# Patient Record
Sex: FEMALE | Race: WHITE | Employment: FULL TIME | URBAN - METROPOLITAN AREA
[De-identification: names, ages, dates, MRNs, and addresses within clinical notes are randomized per-mention and may not be internally consistent; named-entity substitution may affect disease eponyms.]

---

## 2020-07-06 ENCOUNTER — HOSPITAL ENCOUNTER (OUTPATIENT)
Dept: PHYSICAL THERAPY | Age: 25
Discharge: HOME | End: 2020-07-06
Payer: COMMERCIAL

## 2020-07-06 PROCEDURE — 97162 PT EVAL MOD COMPLEX 30 MIN: CPT

## 2020-07-06 PROCEDURE — 97535 SELF CARE MNGMENT TRAINING: CPT

## 2020-07-06 PROCEDURE — 97140 MANUAL THERAPY 1/> REGIONS: CPT

## 2020-07-06 NOTE — PROGRESS NOTES
[address] PHYSICAL THERAPY  [address] [place] [address] [address],Ariela Watts, [address] [facility] [address] - Phone: [phone]  Fax: [phone] / [phone] [facility]  Patient Name: Gurpreet Nicole : 1995   Medical   Diagnosis: Back pain [M54.9] Treatment Diagnosis: Back pain [M54.9]   Onset Date: Chronic     Referral Source: Angeles Norwood MD Start of Care [facility]): 2020   Prior Hospitalization: See medical history Provider #: [id number]   Prior Level of Function: Difficulty with prolonged sitting    Comorbidities: Anemia   Medications: Verified on Patient Summary List   The Plan of Care and following information is based on the information from the initial evaluation.   ===========================================================================================  Assessment / key information:  Gurpreet Nicole is a 26 y.o.  yo female with Dx of Back pain [M54.9]. She reports chronic Hx of scoliosis since middle school. She currently rates her pain as 8/10 at worst, 1/10 at best, primarily located at R upper back and L lower lumbar region. She complains of difficulty and increase pain with sitting without a back support. Objective Findings:  Manual Muscle Testing:   L hip ext is Reduced. Lumbar/SI Screening:  L iliosacral ant innominate noted. L5 L ERS noted. Palpation:  Significant increase in soft tissue tensio noted at L psoas. Limited mobility at T6-8 on R, limited rib mobility at R 4-6th rib noted.    Pt instructed in HEP and will f/u in clinic for PT.  ===========================================================================================  Eval Complexity: History MEDIUM  Complexity : 1-2 comorbidities / personal factors will impact the outcome/ POC ;  Examination  MEDIUM Complexity : 3 Standardized tests and measures addressing body structure, function, activity limitation and / or participation in recreation ; Presentation MEDIUM Complexity : Evolving with changing characteristics ; Decision Making MEDIUM Complexity : FOTO score of 26-74; Overall Complexity MEDIUM  Problem List: pain affecting function, decrease ROM, decrease strength, decrease ADL/ functional abilitiies, decrease activity tolerance and decrease flexibility/ joint mobility   Treatment Plan may include any combination of the following: Therapeutic exercise, Therapeutic activities, Neuromuscular re-education, Physical agent/modality, Manual therapy, Patient education, Self Care training and Functional mobility training  Patient / Family readiness to learn indicated by: asking questions  Persons(s) to be included in education: patient (P)  Barriers to Learning/Limitations: None  Measures taken, if barriers to learning:    Patient Goal (s): Decrease pain    Patient self reported health status: excellent  Rehabilitation Potential: good     Short Term Goals: To be accomplished in  1-2  weeks:  1. Independent with HEP. 2. Decrease max pain 25-50% to assist with ADLs   Long Term Goals: To be accomplished in  3-4  weeks:  1. Decrease max pain 50-75% to assist with ADLs  2. Increase FOTO score to 86% to show functional improvment. 3.  Will rate  >/= +5 on Global Rating of Change and be prepared to DC to HEP. Frequency / Duration:   Patient to be seen  2-3  times per week for 3-4  weeks:  Patient / Caregiver education and instruction: self care and exercises    Therapist Signature: Lars Cao, ERICA, OCS, SCS, CSCS Date: 7/0/7608   Certification Period: na Time: 12:16 PM   ===========================================================================================  I certify that the above Physical Therapy Services are being furnished while the patient is under my care. I agree with the treatment plan and certify that this therapy is necessary.     Physician Signature:        Date:       Time:     Please sign and return to [facility] [place] or you may fax the signed copy to [phone]. Thank you.

## 2020-07-06 NOTE — PROGRESS NOTES
PHYSICAL THERAPY - DAILY TREATMENT NOTE    Patient Name: Enriqueta Rojas        Date: 2020  : 1995   YES Patient  Verified  Visit #:     Insurance: Payor: Afua Gudino / Plan: Nathalia Morrison HMO / Product Type: HMO /      In time: 12:20 Out time: 1:00   Total Treatment Time: 40     Medicare Time Tracking (below)   Total Timed Codes (min):  na 1:1 Treatment Time:  na     TREATMENT AREA =  Back pain [M54.9]    SUBJECTIVE  Pain Level (on 0 to 10 scale):    Medication Changes/New allergies or changes in medical history, any new surgeries or procedures? NO    If yes, update Summary List   Subjective Functional Status/Changes:  []  No changes reported     SEE IE          OBJECTIVE    15 min Manual Therapy: DTM L psoas, Rib 4-6 mob on R, T/S mob, shot gun tech, L IS ant inn pierce, L5 L ERS pierce   Rationale:      decrease pain, increase ROM and increase tissue extensibility to improve patient's ability to perform ADLs    10 min Self Care: MARGARET L hip shift, t/s ROt   Rationale:    increase ROM, increase strength and improve coordination to improve the patients ability to perform ADLs      Billed With/As:   [] TE   [] TA   [] Neuro   [x] Self Care Patient Education: [x] Review HEP    [] Progressed/Changed HEP based on:   [] positioning   [] body mechanics   [] transfers   [] heat/ice application    [] other:       min Patient Education:  YES  Reviewed HEP   []  Progressed/Changed HEP based on:         Other Objective/Functional Measures:    SEE IE     Post Treatment Pain Level (on 0 to 10) scale:       ASSESSMENT  Assessment/Changes in Function:     SEE IE     []  See Progress Note/Recertification   Patient will continue to benefit from skilled PT services to modify and progress therapeutic interventions, address functional mobility deficits, address ROM deficits, address strength deficits, analyze and address soft tissue restrictions, analyze and cue movement patterns, analyze and modify body mechanics/ergonomics and assess and modify postural abnormalities to attain remaining goals.    Progress toward goals / Updated goals:         PLAN  []  Upgrade activities as tolerated YES Continue plan of care   []  Discharge due to :    []  Other:      Therapist: Madeline Martin, PT, OCS, SCS, CSCS    Date: 7/6/2020 Time: 12:17 PM       Future Appointments   Date Time Provider Mark Metcalf   7/6/2020 12:30 PM [id number]

## 2020-07-09 ENCOUNTER — HOSPITAL ENCOUNTER (OUTPATIENT)
Dept: PHYSICAL THERAPY | Age: 25
Discharge: HOME | End: 2020-07-09
Payer: COMMERCIAL

## 2020-07-09 PROCEDURE — 97110 THERAPEUTIC EXERCISES: CPT

## 2020-07-09 PROCEDURE — 97140 MANUAL THERAPY 1/> REGIONS: CPT

## 2020-07-09 NOTE — PROGRESS NOTES
PHYSICAL THERAPY - DAILY TREATMENT NOTE    Patient Name: Burnis Koyanagi        Date: 2020  : 1995   yes Patient  Verified  Visit #:      12  Insurance: Payor: Aneesh Mendiola / Plan: [place] HMO / Product Type: HMO /      In time: 3:10 Out time: 3:55   Total Treatment Time: 45     Medicare/BCBS Time Tracking (below)   Total Timed Codes (min):  na 1:1 Treatment Time:  na     TREATMENT AREA =  Back pain [M54.9]  SUBJECTIVE  Pain Level (on 0 to 10 scale):  3  / 10   Medication Changes/New allergies or changes in medical history, any new surgeries or procedures?    no  If yes, update Summary List   Subjective Functional Status/Changes:  []  No changes reported     I did the HEP, but not sure if I was doing it correctly           OBJECTIVE      30 min Therapeutic Exercise:  [x]  See flow sheet   Rationale:      increase ROM, increase strength and improve coordination to improve the patients ability to perform ADLs     15 min Manual Therapy: DTM L psoas, Rib 5-6 mob on R, T/S mob, shot gun tech, L IS ant inn pierce, L5 L ERS pierce   Rationale:      decrease pain, increase ROM and increase tissue extensibility to improve patient's ability to perform ADLs      Billed With/As:   [] TE   [] TA   [] Neuro   [] Self Care Patient Education: [x] Review HEP    [] Progressed/Changed HEP based on:   [] positioning   [] body mechanics   [] transfers   [] heat/ice application    [] other:      Other Objective/Functional Measures:    Cont to have sig increase in soft tissue tension noted on L      Post Treatment Pain Level (on 0 to 10) scale:   0  / 10     ASSESSMENT  Assessment/Changes in Function:     Difficulty with L 1/2 TGU     []  See Progress Note/Recertification   Patient will continue to benefit from skilled PT services to modify and progress therapeutic interventions, address functional mobility deficits and address ROM deficits to attain remaining goals. Progress toward goals / Updated goals:     Independent with HEP     PLAN  [x]  Upgrade activities as tolerated yes Continue plan of care   []  Discharge due to :    []  Other:      Therapist: Katie Altman PT    Date: 7/9/2020 Time: 7:51 AM     Future Appointments   Date Time Provider Mark Metcalf   7/9/2020  3:15 PM Federica Parra, PT [facility]   7/24/2020  8:30 AM Federica Parra, PT [facility]   7/30/2020  7:45 AM Federica Parra, PT [id number]   8/6/2020  8:30 AM Federica Parra, PT [id number]

## 2020-07-24 ENCOUNTER — HOSPITAL ENCOUNTER (OUTPATIENT)
Dept: PHYSICAL THERAPY | Age: 25
Discharge: HOME | End: 2020-07-24
Payer: COMMERCIAL

## 2020-07-24 PROCEDURE — 97110 THERAPEUTIC EXERCISES: CPT

## 2020-07-24 PROCEDURE — 97140 MANUAL THERAPY 1/> REGIONS: CPT

## 2020-07-30 ENCOUNTER — HOSPITAL ENCOUNTER (OUTPATIENT)
Dept: PHYSICAL THERAPY | Age: 25
Discharge: HOME | End: 2020-07-30
Payer: COMMERCIAL

## 2020-07-30 PROCEDURE — 97140 MANUAL THERAPY 1/> REGIONS: CPT

## 2020-07-30 PROCEDURE — 97110 THERAPEUTIC EXERCISES: CPT

## 2020-07-30 NOTE — PROGRESS NOTES
PHYSICAL THERAPY - DAILY TREATMENT NOTE    Patient Name: Pete Bansal        Date: 2020  : 1995   yes Patient  Verified  Visit #:      12  Insurance: Payor: Harsh Zaragoza / Plan: [address] [place] HMO / Product Type: HMO /      In time: 7:45 Out time: 8:30   Total Treatment Time: 45     Medicare/BCBS Time Tracking (below)   Total Timed Codes (min):  na 1:1 Treatment Time:  na     TREATMENT AREA =  Back pain [M54.9]  SUBJECTIVE  Pain Level (on 0 to 10 scale):  0  / 10   Medication Changes/New allergies or changes in medical history, any new surgeries or procedures?    no  If yes, update Summary List   Subjective Functional Status/Changes:  []  No changes reported     Its been pretty good. OBJECTIVE  30 min Therapeutic Exercise:  [x]? ?  See flow sheet   Rationale:      increase ROM, increase strength and improve coordination to improve the patients ability to perform ADLs      15 min Manual Therapy: DTM L psoas, T/S mob,  L3 L FRS pierce   Rationale:      decrease pain, increase ROM and increase tissue extensibility to improve patient's ability to perform ADLs         Billed With/As:   [] TE   [] TA   [] Neuro   [] Self Care Patient Education: [x] Review HEP    [] Progressed/Changed HEP based on:   [] positioning   [] body mechanics   [] transfers   [] heat/ice application    [] other:      Other Objective/Functional Measures:    Min decreased mobility noted at t/s today     Post Treatment Pain Level (on 0 to 10) scale:   0  / 10     ASSESSMENT  Assessment/Changes in Function:     josé miguel all progression of ex well without increase in pain      []  See Progress Note/Recertification   Patient will continue to benefit from skilled PT services to modify and progress therapeutic interventions, address functional mobility deficits and address ROM deficits to attain remaining goals.    Progress toward goals / Updated goals:    Progressing well with function      PLAN  [x]  Upgrade activities as tolerated yes Continue plan of care   []  Discharge due to :    []  Other:      Therapist: Vasile Montenegro, PT    Date: 7/30/2020 Time: 6:55 AM     Future Appointments   Date Time Provider Mark Metcalf   7/30/2020  7:45 AM China Colon,  [address] [facility]   8/6/2020  8:30 AM China Colon, PT [id number]

## 2020-08-06 ENCOUNTER — HOSPITAL ENCOUNTER (OUTPATIENT)
Dept: PHYSICAL THERAPY | Age: 25
Discharge: HOME | End: 2020-08-06
Payer: COMMERCIAL

## 2020-08-06 PROCEDURE — 97110 THERAPEUTIC EXERCISES: CPT

## 2020-08-06 PROCEDURE — 97140 MANUAL THERAPY 1/> REGIONS: CPT

## 2020-08-06 NOTE — PROGRESS NOTES
PHYSICAL THERAPY - DAILY TREATMENT NOTE    Patient Name: Lola Grey        Date: 2020  : 1995   yes Patient  Verified  Visit #:     Insurance: Payor: Jorge Montgomery / Plan: Joellen Velazquez HMO / Product Type: HMO /      In time: 8:25 Out time: 8:55   Total Treatment Time: 30     Medicare/[facility] Time Tracking (below)   Total Timed Codes (min):  na 1:1 Treatment Time:  na     TREATMENT AREA =  Back pain [M54.9]  SUBJECTIVE  Pain Level (on 0 to 10 scale):  2  / 10   Medication Changes/New allergies or changes in medical history, any new surgeries or procedures?    no  If yes, update Summary List   Subjective Functional Status/Changes:  []  No changes reported     I did a lot of cleaning in a pool yesterday so Im hurting. 5/10 at the worst, but its getting better          OBJECTIVE  20 min Therapeutic Exercise:  [x]? ??  See flow sheet   Rationale:      increase ROM, increase strength and improve coordination to improve the patients ability to perform ADLs      10 min Manual Therapy: DTM L psoas, T/S mob,  L3-4 L FRS pierce, 12 th rib mob   Rationale:      decrease pain, increase ROM and increase tissue extensibility to improve patient's ability to perform ADLs      Billed With/As:   [] TE   [] TA   [] Neuro   [] Self Care Patient Education: [x] Review HEP    [] Progressed/Changed HEP based on:   [] positioning   [] body mechanics   [] transfers   [] heat/ice application    [] other:      Other Objective/Functional Measures:    Increased soft tissue tension noted at L QL with limited 12th rib mobility today  - Add Drop test B today   Post Treatment Pain Level (on 0 to 10) scale:   0  / 10     ASSESSMENT  Assessment/Changes in Function:     Slight difficulty with CC Lat Rot Pull with L UE     []  See Progress Note/Recertification   Patient will continue to benefit from skilled PT services to modify and progress therapeutic interventions, address functional mobility deficits and address ROM deficits to attain remaining goals.    Progress toward goals / Updated goals:    Slowly progressing with stability     PLAN  [x]  Upgrade activities as tolerated yes Continue plan of care   []  Discharge due to :    []  Other:      Therapist: Jasmine Saleh, PT    Date: 8/6/2020 Time: 6:56 AM     Future Appointments   Date Time Provider Mark Metcalf   8/6/2020  8:30 AM Humberto Patel, PT [id number]

## 2020-08-06 NOTE — PROGRESS NOTES
[address] PHYSICAL THERAPY  [address] [place] [address] 51, Rach[address]oisi [address],Ariela Cordoba, [address] [facility] - Phone: [phone]  Fax: [phone]  DISCHARGE NOTE  Patient Name: Igor Valera : 1995   Treatment/Medical Diagnosis: Back pain [M54.9]   Referral Source: Dada Ndiaye MD     Date of Initial Visit: 20 Attended Visits: 5 Missed Visits: 0     SUMMARY OF TREATMENT  Igor Valera has been seen at our clinic 1x/wk for a total of 5 visits. Pt treatment has consisted of  therapeutic exercise for thoracic ROM, hip/core strengthening, and manual therapy(jt mobilization and deep tissue mobilization)  CURRENT STATUS  Pt has had a good tolerance to physical therapy treatment. She reports overall decrease in her pain level and demonstrates improved hip stability with therapeutic exercises. She continues to present with limited 12th rib mobility and increased soft tissue tension at L quadratus lumborum. She did not return to PT treatment after the 5th visit. Goal/Measure of Progress Goal Met? 1. Decrease Max pain by 50-75% to assist with ADLs   Status at last Eval: 8/10 Current Status: 5/10 progressing   2. Increase FOTO score to 86 % show functional improvement   Status at last Eval: 83% Current Status: na% n/a   3. Will rate >/= +5 on Global Rating of Change and be prepared to DC to HEP. Status at last Eval: na Current Status: na n/a     RECOMMENDATIONS  Discharge from PT treatment at this time. If you have any questions/comments please contact us directly at [phone]. Thank you for allowing us to assist in the care of your patient.     Therapist Signature: Ambreen Cardenas DPT, OCS, SCS, CSCS Date: 2020     Time: 8:57 AM

## 2020-09-01 ENCOUNTER — APPOINTMENT (OUTPATIENT)
Dept: PHYSICAL THERAPY | Age: 25
End: 2020-09-01

## 2020-09-09 ENCOUNTER — APPOINTMENT (OUTPATIENT)
Dept: PHYSICAL THERAPY | Age: 25
End: 2020-09-09

## 2020-09-15 ENCOUNTER — APPOINTMENT (OUTPATIENT)
Dept: PHYSICAL THERAPY | Age: 25
End: 2020-09-15

## 2020-09-22 ENCOUNTER — APPOINTMENT (OUTPATIENT)
Dept: PHYSICAL THERAPY | Age: 25
End: 2020-09-22